# Patient Record
Sex: MALE | Race: OTHER | NOT HISPANIC OR LATINO | Employment: UNEMPLOYED | URBAN - METROPOLITAN AREA
[De-identification: names, ages, dates, MRNs, and addresses within clinical notes are randomized per-mention and may not be internally consistent; named-entity substitution may affect disease eponyms.]

---

## 2022-12-16 ENCOUNTER — HOSPITAL ENCOUNTER (EMERGENCY)
Facility: MEDICAL CENTER | Age: 22
End: 2022-12-16
Attending: EMERGENCY MEDICINE
Payer: COMMERCIAL

## 2022-12-16 ENCOUNTER — APPOINTMENT (OUTPATIENT)
Dept: RADIOLOGY | Facility: MEDICAL CENTER | Age: 22
End: 2022-12-16
Attending: EMERGENCY MEDICINE
Payer: COMMERCIAL

## 2022-12-16 VITALS
SYSTOLIC BLOOD PRESSURE: 121 MMHG | TEMPERATURE: 99.2 F | BODY MASS INDEX: 27.47 KG/M2 | HEART RATE: 71 BPM | DIASTOLIC BLOOD PRESSURE: 62 MMHG | HEIGHT: 68 IN | OXYGEN SATURATION: 96 % | RESPIRATION RATE: 20 BRPM | WEIGHT: 181.22 LBS

## 2022-12-16 DIAGNOSIS — R07.9 CHEST PAIN, UNSPECIFIED TYPE: ICD-10-CM

## 2022-12-16 LAB — EKG IMPRESSION: NORMAL

## 2022-12-16 PROCEDURE — 71045 X-RAY EXAM CHEST 1 VIEW: CPT

## 2022-12-16 PROCEDURE — 99283 EMERGENCY DEPT VISIT LOW MDM: CPT

## 2022-12-16 PROCEDURE — 93005 ELECTROCARDIOGRAM TRACING: CPT | Performed by: EMERGENCY MEDICINE

## 2022-12-16 PROCEDURE — 93005 ELECTROCARDIOGRAM TRACING: CPT

## 2022-12-16 NOTE — ED NOTES
Assumed patient of care after receiving report from previous shift nurse. Checked on bed, verified patient identification, connected to monitor,  with unlabored respirations. Vital signs is stable. Denied any new complaints. Gurney in low position, side rail up for pt safety. Call light within reach. Will continue to monitor for any complications.

## 2022-12-16 NOTE — ED NOTES
Patient discharged per order. Oral and written discharge instructions reviewed.  All belongings accounted for and taken with patient. Questions answered, and patient agrees with discharge plan. Patient escorted to lobby. Encouraged to follow up with PCP.

## 2022-12-16 NOTE — ED PROVIDER NOTES
"ED Provider Note    Scribed for No att. providers found by Alexsandra Goodwin. 12/16/2022,  8:38 AM.    Means of Arrival: Walk-in  History obtained from: Patient  History limited by: None    CHIEF COMPLAINT  Chief Complaint   Patient presents with    Chest Pain     Intermittent pain for 1 1/2 weeks describes as pressure to L side of chest with occasional stabbing sensations     HPI  Joce Collins is a 22 y.o. male who presents to the Emergency Department for mild left sided chest pain onset 1.5 weeks ago. The patient denies a history of similar symptoms. He states symptoms of feeling his heart pulsating and seeing black pulsations in his left eye when he closes his eyes. The patient reports his pain is alleviated when he leans forward. He denies symptoms of vision changes. He denies any medical issues. No exacerbating factors noted.     REVIEW OF SYSTEMS  CONSTITUTIONAL:  No fever.  CARDIOVASCULAR:  See HPI  RESPIRATORY:  See HPI  GASTROINTESTINAL:  No abdominal pain.  GENITOURINARY:   No dysuria.  MUSCULOSKELETAL:  No arthralgia.  See HPI for further details.   All other systems are negative.     PAST MEDICAL HISTORY  History reviewed. No pertinent past medical history.    FAMILY HISTORY  History reviewed. No pertinent family history.    SOCIAL HISTORY   reports that he has never smoked. He has never used smokeless tobacco. He reports that he does not drink alcohol and does not use drugs.    SURGICAL HISTORY  History reviewed. No pertinent surgical history.    CURRENT MEDICATIONS  Home Medications       Reviewed by Stefania Li R.N. (Registered Nurse) on 12/16/22 at 0544  Med List Status: Not Addressed     Medication Last Dose Status        Patient John Taking any Medications                         ALLERGIES  No Known Allergies    PHYSICAL EXAM  VITAL SIGNS: /62   Pulse 71   Temp 37.3 °C (99.2 °F) (Temporal)   Resp 20   Ht 1.727 m (5' 8\")   Wt 82.2 kg (181 lb 3.5 oz)   SpO2 96%   BMI " 27.55 kg/m²    Gen: Alert, no acute distress  HEENT: ATNCfr  Eyes: PERRL, EOMI, normal conjunctiva  Neck: trachea midline  Resp: no respiratory distress, CTAB. No wheezes or crackles  CV: No JVD, RRR. No M/R/G. Equal radial pulses  Abd: non-distended, non-tender  Ext: No deformities. No pedal edema, no calf tenderness  Psych: normal mood  Neuro: speech fluent     DIAGNOSTIC STUDIES / PROCEDURES     EKG  Results for orders placed or performed during the hospital encounter of 22   EKG   Result Value Ref Range    Report       [facility].    Test Date:  2022  Pt Name:    JOSE GUADALUPE WATKINS                Department: ER  MRN:        [id number]                      Room:  Gender:     Male                         Technician: [id number]  :        2000                   Requested By:ER TRIAGE PROTOCOL  Order #:    [id number]                    Reading MD: Dustin Baugh    Measurements  Intervals                                Axis  Rate:       76                           P:          73  MA:         130                          QRS:        78  QRSD:       93                           T:          11  QT:         367  QTc:        413    Interpretive Statements  Sinus rhythm  Consider right atrial enlargement  Minimal ST depression, inferior leads  No previous ECG available for comparison  Electronically Signed On 2022 7:25:48 PST by Dustin Baugh        RADIOLOGY  DX-CHEST-PORTABLE (1 VIEW)   Final Result         1.  No acute cardiopulmonary disease.        The radiologist’s interpretation of all radiology studies have been reviewed by me.    COURSE & MEDICAL DECISION MAKING  Pertinent Labs & Imaging studies reviewed. (See chart for details)    8:03 AM - Patient seen and examined at bedside with Resident Dr. Durant. I discussed with the patient that his physical exam, radiology results, and EKG are reassuring. At this time I am not concerned for a heart attack, torn or damaged aorta,  or blood clots. I informed the patient that the sensation he has felt, though abnormal to him, is not super concerning and can happen on occasion. The patient was given the opportunity to ask questions at this time. I discussed plan for discharge and follow up as outlined below. The patient is stable for discharge at this time and will return for any new or worsening symptoms. Patient verbalizes understanding and support with my plan for discharge.     I wore the appropriate PPE during this encounter.      Medical Decision Making:  Patient presents with left-sided chest pain that has resolved but now has a sensation of feeling his pulse particular throughout the left side of his body.  He is PE RC negative for pulmonary embolism.  EKG is nonischemic.  No evidence of pericarditis.  Labs considered, however it very low suspicion for myopericarditis, ACS.  Chest x-ray negative for spontaneous pneumothorax, lung masses, infection.  Bedside ultrasound performed which demonstrates grossly normal heart function, no evidence of pericardial effusion, grossly normal aortic root, no evidence of right ventricular strain.  At this point time, this does not appear to fit with aortic dissection, Boerhaave syndrome, or other dangerous etiology.    The patient will return for new or worsening symptoms and is stable at the time of discharge.    The patient is referred to a primary physician for diabetic screening and for all other preventative health concerns.    DISPOSITION:  Patient will be discharged home in stable condition.    FOLLOW UP:  To establish a primary care provider within our system, please call [phone]    Schedule an appointment as soon as possible for a visit   As needed    [facility], Emergency Dept  [address] [address] [phone]    If symptoms worsen      FINAL IMPRESSION  1. Chest pain, unspecified type      I, Alexsandra Goodwin (Shagufta), am scribing for, and in  the presence of, No att. providers found.    Electronically signed by: Alexsandra Goodwin (Scribe), 12/16/2022    I, No att. providers found personally performed the services described in this documentation, as scribed by Alexsandra Goodwin in my presence, and it is both accurate and complete.    The note accurately reflects work and decisions made by me.  Dustin Baugh M.D.  12/16/2022  12:58 PM    This dictation was created using voice recognition software. The accuracy of the dictation is limited to the abilities of the software. I expect there may be some errors of grammar and possibly content. The nursing notes were reviewed and certain aspects of this information were incorporated into this note.

## 2022-12-16 NOTE — DISCHARGE INSTRUCTIONS
You are seen emerged part for chest pain and a sensation of feeling and seeing a pulse throughout her body.  Thankfully, your work-up was reassuring.  Your chest x-ray shows no abnormalities of the lungs.  Your EKG shows no signs of injury to the heart or abnormal rhythm.  Your physical exam and bedside ultrasound were also normal.  At this point, his symptoms do not appear to be dangerous.    You have been given a number to contact if you do not have a primary care provider to establish 1.  We do recommend having a primary care provider for follow-up.    Please return to the emergency department or seek medical attention if you develop:  Difficulty breathing, inability to perform your exercises, new or different chest pains, any other new or concerning findings

## 2022-12-16 NOTE — ED TRIAGE NOTES
Chief Complaint   Patient presents with    Chest Pain     Intermittent pain for 1 1/2 weeks describes as pressure to L side of chest with occasional stabbing sensations        Pt reports being able to feel pulsations in chest, b/l hands and feet also is able to hear heart beating in his ears.       Pt ambulatory to triage for above complaint.       Pt is alert/oriented and follows commands. Pt speaking in full sentences and responds appropriately to questions. No acute distress noted in triage and respirations are even and unlabored.      Pt placed in lobby and educated on triage process. Pt encouraged to alert staff for any changes in condition